# Patient Record
(demographics unavailable — no encounter records)

---

## 2024-12-29 NOTE — END OF VISIT
[FreeTextEntry3] : "I, Perry Schmidt, personally scribed the services dictated to me by Dr. Clifton Jimenes in this documentation on 12/27/2024"   "I, Dr. Clifton Jimenes, DO, personally performed the services described in this documentation on 12/27/2024 for the patient as scribed by Perry Schmidt in my presence. I have reviewed and verified that all the information is accurate and true." [Time Spent: ___ minutes] : I have spent [unfilled] minutes of time on the encounter which excludes teaching and separately reported services.

## 2024-12-29 NOTE — PHYSICAL EXAM
[No Acute Distress] : no acute distress [Well Nourished] : well nourished [Well Developed] : well developed [Well-Appearing] : well-appearing [Normal Voice/Communication] : normal voice/communication [Normal Sclera/Conjunctiva] : normal sclera/conjunctiva [PERRL] : pupils equal round and reactive to light [EOMI] : extraocular movements intact [Normal Outer Ear/Nose] : the outer ears and nose were normal in appearance [Normal Oropharynx] : the oropharynx was normal [Normal TMs] : both tympanic membranes were normal [Normal Nasal Mucosa] : the nasal mucosa was normal [No JVD] : no jugular venous distention [No Lymphadenopathy] : no lymphadenopathy [Supple] : supple [Thyroid Normal, No Nodules] : the thyroid was normal and there were no nodules present [No Respiratory Distress] : no respiratory distress  [No Accessory Muscle Use] : no accessory muscle use [Clear to Auscultation] : lungs were clear to auscultation bilaterally [Normal Percussion] : the chest was normal to percussion [Normal Rate] : normal rate  [Regular Rhythm] : with a regular rhythm [Normal S1, S2] : normal S1 and S2 [No Murmur] : no murmur heard [No Carotid Bruits] : no carotid bruits [No Abdominal Bruit] : a ~M bruit was not heard ~T in the abdomen [No Varicosities] : no varicosities [Pedal Pulses Present] : the pedal pulses are present [No Edema] : there was no peripheral edema [No Palpable Aorta] : no palpable aorta [No Extremity Clubbing/Cyanosis] : no extremity clubbing/cyanosis [Soft] : abdomen soft [Non Tender] : non-tender [Non-distended] : non-distended [No Masses] : no abdominal mass palpated [No HSM] : no HSM [Normal Bowel Sounds] : normal bowel sounds [Normal Supraclavicular Nodes] : no supraclavicular lymphadenopathy [Normal Axillary Nodes] : no axillary lymphadenopathy [Normal Posterior Cervical Nodes] : no posterior cervical lymphadenopathy [Normal Anterior Cervical Nodes] : no anterior cervical lymphadenopathy [Normal Inguinal Nodes] : no inguinal lymphadenopathy [Normal Femoral Nodes] : no femoral lymphadenopathy [No CVA Tenderness] : no CVA  tenderness [No Spinal Tenderness] : no spinal tenderness [No Joint Swelling] : no joint swelling [Grossly Normal Strength/Tone] : grossly normal strength/tone [No Rash] : no rash [Coordination Grossly Intact] : coordination grossly intact [No Focal Deficits] : no focal deficits [Normal Gait] : normal gait [Deep Tendon Reflexes (DTR)] : deep tendon reflexes were 2+ and symmetric [Speech Grossly Normal] : speech grossly normal [Memory Grossly Normal] : memory grossly normal [Normal Affect] : the affect was normal [Alert and Oriented x3] : oriented to person, place, and time [Normal Mood] : the mood was normal [Normal Insight/Judgement] : insight and judgment were intact [No Hernias] : no hernias [Kyphosis] : no kyphosis [Scoliosis] : no scoliosis [Acne] : no acne

## 2024-12-29 NOTE — HEALTH RISK ASSESSMENT
[Yes] : Yes [Never] : Never [Little interest or pleasure doing things] : 1) Little interest or pleasure doing things [Feeling down, depressed, or hopeless] : 2) Feeling down, depressed, or hopeless [0] : 2) Feeling down, depressed, or hopeless: Not at all (0) [PHQ-2 Negative - No further assessment needed] : PHQ-2 Negative - No further assessment needed [2 - 4 times a month (2 pts)] : 2-4 times a month (2 points) [3 or 4 (1 pt)] : 3 or 4  (1 point) [Never (0 pts)] : Never (0 points) [No] : In the past 12 months have you used drugs other than those required for medical reasons? No [Good] : ~his/her~  mood as  good [Audit-CScore] : 3 [[id number]] : 0

## 2024-12-29 NOTE — ASSESSMENT
[FreeTextEntry1] : Patient is a 21-year-old male with a past medical history as above presenting today for a new patient annual wellness exam.

## 2024-12-29 NOTE — PLAN
[FreeTextEntry1] : check EKG - results as above check fasting bloodwork for CBC, A1C, CMP, TSH, lipid panel, vitamin D obtain immunization record-mother to bring copy to office continue with diet and exercise plan

## 2024-12-29 NOTE — HEALTH RISK ASSESSMENT
[Yes] : Yes [Never] : Never [Little interest or pleasure doing things] : 1) Little interest or pleasure doing things [Feeling down, depressed, or hopeless] : 2) Feeling down, depressed, or hopeless [0] : 2) Feeling down, depressed, or hopeless: Not at all (0) [PHQ-2 Negative - No further assessment needed] : PHQ-2 Negative - No further assessment needed [2 - 4 times a month (2 pts)] : 2-4 times a month (2 points) [3 or 4 (1 pt)] : 3 or 4  (1 point) [Never (0 pts)] : Never (0 points) [No] : In the past 12 months have you used drugs other than those required for medical reasons? No [Good] : ~his/her~  mood as  good [Audit-CScore] : 3 [[id number]] : 0

## 2024-12-29 NOTE — HISTORY OF PRESENT ILLNESS
[Parent] : parent [FreeTextEntry1] : new patient comprehensive annual visit [de-identified] : Patient is a 21-year-old male with a past medical history as below presenting today for a new-patient comprehensive annual exam. 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 Patient denies any past medical problems or any surgical history. Patient is not taking any prescription medications or vitamins. Patient is vaccinated against COVID (2 doses). Pt has not been vaccinated against the Influenza virus in the past few years. Patient states he goes to the gym 3-5 times a week and does weight-bearing exercises and cardio. Patient states his diet is generally healthy with no restrictions.  He eats protein, fruit, vegetables and mostly does his own cooking/food preparation.

## 2024-12-29 NOTE — HISTORY OF PRESENT ILLNESS
[Parent] : parent [FreeTextEntry1] : new patient comprehensive annual visit [de-identified] : Patient is a 21-year-old male with a past medical history as below presenting today for a new-patient comprehensive annual exam. 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 Patient denies any past medical problems or any surgical history. Patient is not taking any prescription medications or vitamins. Patient is vaccinated against COVID (2 doses). Pt has not been vaccinated against the Influenza virus in the past few years. Patient states he goes to the gym 3-5 times a week and does weight-bearing exercises and cardio. Patient states his diet is generally healthy with no restrictions.  He eats protein, fruit, vegetables and mostly does his own cooking/food preparation.